# Patient Record
Sex: MALE | Race: WHITE | HISPANIC OR LATINO | Employment: OTHER | URBAN - METROPOLITAN AREA
[De-identification: names, ages, dates, MRNs, and addresses within clinical notes are randomized per-mention and may not be internally consistent; named-entity substitution may affect disease eponyms.]

---

## 2018-01-10 NOTE — MISCELLANEOUS
Message   Recorded as Task   Date: 04/18/2016 11:10 AM, Created By: Alessio Heredia   Task Name: Med Renewal Request   Assigned To: [facility] [place] [facility]   Regarding Patient: Raisa Hairston, Status: Active   Comment:    Jackie Hernandez - 18 Apr 2016 11:10 AM     TASK CREATED  Caller: Self; Renew Medication; [phone] (Home)  Received a TC from the pt  today requesting a refill on the Lyrica  He stated he has not received the refill as of yet in the mail  He only has 3 pills left  Pt  last seen on 3/15 and Lyrica last ordered at that time with no refills  Next povs is on 6/7  DG to advise  thanks   Alvin Linares - 18 Apr 2016 12:28 PM     TASK REPLIED TO: Previously Assigned To [facility] [place] [facility]  Can you please call [employer] at: [phone] as I faxed that script a month ago can we see if they received it? Jackie Hernandez - 18 Apr 2016 2:01 PM     TASK EDITED  Notified [employer] at number provided and s/w Dalia Anand and she states that no script was received from about a month ago  She would need everything refaxed, including the insurance and photo ID  DG to advise  thanks   Alvin Linares - 18 Apr 2016 3:26 PM     TASK REPLIED TO: Previously Assigned To Alvin Linares  Please call the patient and advise him that apparently [facility] never received the refill script I faxed in March  We can write him a 2 week script for the Lyrica 300 mg BID to fill until we can get him the pills from [facility]  However, please advise him that in the future, he should call once he is down to 2 weeks left a not just a few days because [employer] requires at least a week to turn around the script  Jackie Hernandez - 18 Apr 2016 4:13 PM     TASK EDITED  Attempted to call pt , unable to leave message no answering machine  Jackie Hernandez - 18 Apr 2016 4:13 PM     TASK IN PROGRESS   Teresa Posadas - 19 Apr 2016 2:00 PM     TASK EDITED    I spoke with pt, advised above  Pt will  script tomorrow    *******   Alvin Linares - 19 Apr 2016 2:06 PM     TASK REPLIED TO: Previously Assigned To Alvin Linares  A 2 week script is available for  at the   Active Problems    1  ALC (alcoholic liver cirrhosis) (571 2) (K70 30)   2  Chronic low back pain (724 2,338 29) (M54 5,G89 29)   3  Chronic lumbar radiculopathy (724 4) (M54 16)   4  Encounter for long-term opiate analgesic use (V58 69) (Z79 891)   5  Foraminal stenosis of lumbar region (724 02) (M99 83)   6  Opioid dependence, continuous (304 01) (F11 20)   7  Pain syndrome, chronic (338 4) (G89 4)    Current Meds   1  Ciprofloxacin HCl - 500 MG Oral Tablet; Therapy: 77XJJ3849 to (Evaluate:17Feb2016) Recorded   2  Citalopram Hydrobromide 40 MG Oral Tablet; Therapy: 72MSR3873 to (Evaluate:01Jun2015) Recorded   3  Folic Acid 1 MG Oral Tablet; Therapy: 12SMM4409 to (Evaluate:01Jun2015) Recorded   4  Lisinopril 20 MG Oral Tablet; Therapy: 68KUE4220 to (Evaluate:02Apr2015) Recorded   5  Lyrica 300 MG Oral Capsule; Take 1 po bid; Therapy: 27RUE1309 to (Evaluate:15Oct2016); Last Rx:18Apr2016 Ordered   6  MetFORMIN HCl - 500 MG Oral Tablet; Therapy: 00AUJ5563 to (Evaluate:01Jun2015) Recorded   7  Naproxen 500 MG Oral Tablet; Therapy: 25WKJ2442 to (Evaluate:02Apr2015) Recorded   8  Omeprazole 20 MG Oral Capsule Delayed Release; Therapy: 07LMC8835 to (Evaluate:23Mar2015) Recorded   9  Potassium Chloride 20 MEQ Oral Packet; Therapy: 10OYQ4495 to (Evaluate:01Jun2015) Recorded   10  ProAir  (90 Base) MCG/ACT Inhalation Aerosol Solution; Therapy: 26IMY4314 to (Evaluate:90Elv2260) Recorded   11  ROPINIRole HCl - 3 MG Oral Tablet; Therapy: 67Mha6092 to (Evaluate:01Jun2015) Recorded   12  Spironolactone 100 MG Oral Tablet; Therapy: 49DRC6767 to (Evaluate:01Jun2015) Recorded   13  Torsemide 20 MG Oral Tablet; Therapy: 96EHR7625 to (Evaluate:01Jun2015) Recorded   14  TraMADol HCl ER (Biphasic) 100 MG Oral Tablet Extended Release 24 Hour; Take 1 PO    bid;     Therapy: 96OCG8128 to (Evaluate:13Jun2016); Last Rx:15Mar2016 Ordered   15  Vitamin D (Ergocalciferol) 38286 UNIT Oral Capsule; Therapy: 96YUB4203 to (Evaluate:21Apr2015) Recorded    Allergies    1  Iodinated Contrast Media    Signatures   Electronically signed by :  Petr Reza, ; Apr 19 2016  2:09PM EST                       (Author)

## 2018-01-10 NOTE — MISCELLANEOUS
Message   Recorded as Task   Date: 06/15/2016 09:33 AM, Created By: Dong Oliver   Task Name: Follow Up   Assigned To: Taylor Johnson   Regarding Patient: Wolf Aquino, Status: In Progress   Wiltoncaio oMshe - 15 Romulo 2016 9:33 AM     TASK CREATED  Received letter, patient accepted into the [facility] program until 12/31/16  Lyrica has been shipped to patient's home  Abdiel Quigley - 15 Romulo 2016 10:02 AM     TASK REPLIED TO: Previously Assigned To Alvin Linares Jessie - 16 Jun 2016 8:10 AM     TASK IN PROGRESS        Active Problems    1  ALC (alcoholic liver cirrhosis) (571 2) (K70 30)   2  Chronic low back pain (724 2,338 29) (M54 5,G89 29)   3  Chronic lumbar radiculopathy (724 4) (M54 16)   4  Encounter for long-term opiate analgesic use (V58 69) (Z79 891)   5  Foraminal stenosis of lumbar region (724 02) (M99 83)   6  Opioid dependence, continuous (304 01) (F11 20)   7  Pain syndrome, chronic (338 4) (G89 4)    Current Meds   1  Ciprofloxacin HCl - 500 MG Oral Tablet; Therapy: 61PKX0579 to (Evaluate:52Qmm9771) Recorded   2  Citalopram Hydrobromide 40 MG Oral Tablet; Therapy: 35XRR8215 to (Evaluate:01Jun2015) Recorded   3  Folic Acid 1 MG Oral Tablet; Therapy: 75UEQ5321 to (Evaluate:01Jun2015) Recorded   4  Lisinopril 20 MG Oral Tablet; Therapy: 47IWH6064 to (Evaluate:02Apr2015) Recorded   5  Lyrica 300 MG Oral Capsule; Take 1 po bid; Therapy: 35HFX6432 to (Evaluate:18Oct2016); Last Rx:21Apr2016 Ordered   6  MetFORMIN HCl - 500 MG Oral Tablet; Therapy: 28MWW4763 to (Evaluate:01Jun2015) Recorded   7  Naproxen 500 MG Oral Tablet; Therapy: 96SBI4261 to (Evaluate:02Apr2015) Recorded   8  Omeprazole 20 MG Oral Capsule Delayed Release; Therapy: 04PBM8995 to (Evaluate:23Mar2015) Recorded   9  Potassium Chloride 20 MEQ Oral Packet; Therapy: 23FQG0229 to (Evaluate:01Jun2015) Recorded   10  ProAir  (90 Base) MCG/ACT Inhalation Aerosol Solution;     Therapy: 58NLF9211 to (Evaluate:42Dcv8162) Recorded   11  ROPINIRole HCl - 3 MG Oral Tablet; Therapy: 29Bju6774 to (Evaluate:01Jun2015) Recorded   12  Spironolactone 100 MG Oral Tablet; Therapy: 85IRA1371 to (Evaluate:01Jun2015) Recorded   13  Torsemide 20 MG Oral Tablet; Therapy: 96KAD4207 to (Evaluate:01Jun2015) Recorded   14  TraMADol HCl ER (Biphasic) 100 MG Oral Tablet Extended Release 24 Hour; Take 1 PO    bid; Therapy: 57ARB2679 to (Evaluate:13Jun2016); Last Rx:15Mar2016 Ordered   15  Vitamin D (Ergocalciferol) 33075 UNIT Oral Capsule; Therapy: 81SWU0592 to (Evaluate:21Apr2015) Recorded    Allergies    1   Iodinated Contrast Media    Signatures   Electronically signed by : Shantelle Ball, ; Jun 16 2016 11:50AM EST                       (Author)

## 2018-01-11 NOTE — RESULT NOTES
Message   Recorded as Task   Date: 06/01/2016 11:27 AM, Created By: Jayy Ellison   Task Name: Care Coordination   Assigned To: John Reid   Regarding Patient: Carolyn West, Status: Active   CommentMaceo Chill - 01 Jun 2016 11:27 AM     TASK CREATED  Received VM today at 0941 from [facility]  She states the appeal for tramadol ER 100mg was sent [facility] and that they would get back to us  Alvin Linares - 01 Jun 2016 11:41 AM     TASK REPLIED TO: Previously Assigned To Alvin Linares  Provider aware  Thank you  Signatures   Electronically signed by :  Reina Hawkins, ; Jun 2 2016  7:42AM EST                       (Author)

## 2018-01-13 NOTE — MISCELLANEOUS
Message   Recorded as Task   Date: 03/09/2016 08:58 AM, Created By: Purvi Lara   Task Name: Miscellaneous   Assigned To: Purvi Lara   Regarding Patient: Mayito Rizvi, Status: Active   CommentAlsam Hilario - 09 Mar 2016 8:58 AM     TASK CREATED  Patient left a message with the answering service to cancel his appt/ 3/9/16 @ 10:45 AM with Alvin  I called the patient and left a message to call the office to reschedule  He gave no reason for the cancellation  Alvin Linares - 09 Mar 2016 11:21 AM     TASK REPLIED TO: Previously Assigned To Alvin Linares  Provider aware  Thank you! Active Problems    1  ALC (alcoholic liver cirrhosis) (571 2) (K70 30)   2  Chronic low back pain (724 2,338 29) (M54 5,G89 29)   3  Chronic lumbar radiculopathy (724 4) (M54 16)   4  Encounter for long-term (current) use of other high-risk medications (V58 69) (Z79 899)   5  Foraminal stenosis of lumbar region (724 02) (M99 83)   6  Pain syndrome, chronic (338 4) (G89 4)    Current Meds   1  Ciprofloxacin HCl - 500 MG Oral Tablet; Therapy: 01FXF2673 to (Evaluate:30Seg4873) Recorded   2  Citalopram Hydrobromide 40 MG Oral Tablet; Therapy: 05WLZ8986 to (Evaluate:01Jun2015) Recorded   3  Folic Acid 1 MG Oral Tablet; Therapy: 52LUH7161 to (Evaluate:01Jun2015) Recorded   4  Lisinopril 20 MG Oral Tablet; Therapy: 35XAJ0724 to (Evaluate:02Apr2015) Recorded   5  Lyrica 300 MG Oral Capsule; Take 1 po bid; Therapy: 93NTV8433 to (Evaluate:67Ocj6287); Last Rx:03Nov2015 Ordered   6  MetFORMIN HCl - 500 MG Oral Tablet; Therapy: 49OGA1974 to (Evaluate:01Jun2015) Recorded   7  Naproxen 500 MG Oral Tablet; Therapy: 04RAK5313 to (Evaluate:02Apr2015) Recorded   8  Nucynta ER 50 MG Oral Tablet Extended Release 12 Hour; Take 1 pill daily x 4 days,   then STOP  Then Start Tramadol ER; Therapy: 69BAM8360 to (Evaluate:17Jan2016); Last Rx:13Jan2016 Ordered   9  Omeprazole 20 MG Oral Capsule Delayed Release;    Therapy: 37ZJG4583 to (Evaluate:23Mar2015) Recorded   10  Potassium Chloride 20 MEQ Oral Packet; Therapy: 72AQK7720 to (Evaluate:01Jun2015) Recorded   11  ProAir  (90 Base) MCG/ACT Inhalation Aerosol Solution; Therapy: 99CTX4516 to (Evaluate:17May2015) Recorded   12  ROPINIRole HCl - 3 MG Oral Tablet; Therapy: 89NYU7300 to (Evaluate:01Jun2015) Recorded   13  Spironolactone 100 MG Oral Tablet; Therapy: 72GXD5256 to (Evaluate:01Jun2015) Recorded   14  Torsemide 20 MG Oral Tablet; Therapy: 56LCV7804 to (Evaluate:01Jun2015) Recorded   15  TraMADol HCl ER (Biphasic) 100 MG Oral Tablet Extended Release 24 Hour; Take 1 PO    bid; Therapy: 13LIL7138 to (Evaluate:13Mar2016); Last Rx:13Jan2016 Ordered   16  Vitamin D (Ergocalciferol) 06110 UNIT Oral Capsule; Therapy: 72OEI3334 to (Evaluate:21Apr2015) Recorded    Allergies    1  Iodinated Contrast Media    Signatures   Electronically signed by :  William Chavez, ; Mar  9 2016  3:26PM EST                       (Author)

## 2018-01-13 NOTE — MISCELLANEOUS
Message   Recorded as Task   Date: 10/31/2016 12:17 PM, Created By: Nikkie Ocampo   Task Name: Follow Up   Assigned To: [address] [place],Team   Regarding Patient: Abdifatah Palmer, Status: In Progress   Comment:    Alvin Linares - 31 Oct 2016 12:17 PM     TASK CREATED  Can you please call the patient and advise him that his last drug screen was positive for THC? Can he explain? Jackie Hernandez - 31 Oct 2016 3:05 PM     TASK EDITED  Attempted to call the pt  and LMOM to CB  Jackie Hernandez - 31 Oct 2016 3:05 PM     TASK IN PROGRESS   Fermin Moore - 03 Nov 2016 10:30 AM     TASK EDITED   Fermin Moore - 39 Nov 2016 4:32 PM     TASK EDITED  [facility] to cb on home / cell     2nd attempt   Jackie Hernandez - 07 Nov 2016 1:42 PM     TASK EDITED   Next SOVS is scheduled for 1/27  Would you like us to send him a can't reach you letter? Alvin Linares - 07 Nov 2016 2:14 PM     TASK REPLIED TO: Previously Assigned To Alvin Linares  Yes, please send the letter  Jackie Hernandez - 07 Nov 2016 3:48 PM     TASK EDITED   Letter Sent      Active Problems    1  ALC (alcoholic liver cirrhosis) (571 2) (K70 30)   2  Chronic low back pain (724 2,338 29) (M54 5,G89 29)   3  Chronic lumbar radiculopathy (724 4) (M54 16)   4  Encounter for long-term opiate analgesic use (V58 69) (Z79 891)   5  Foraminal stenosis of lumbar region (724 02) (M99 83)   6  Opioid dependence, continuous (304 01) (F11 20)   7  Pain syndrome, chronic (338 4) (G89 4)    Current Meds   1  Ciprofloxacin HCl - 500 MG Oral Tablet; Therapy: 70QCX1264 to (Evaluate:96Mdi7294) Recorded   2  Citalopram Hydrobromide 40 MG Oral Tablet; Therapy: 09YAM4613 to (Evaluate:01Jun2015) Recorded   3  Folic Acid 1 MG Oral Tablet; Therapy: 78LLF9862 to (Evaluate:01Jun2015) Recorded   4  Lisinopril 20 MG Oral Tablet; Therapy: 00KCN5062 to (Evaluate:02Apr2015) Recorded   5  Lyrica 200 MG Oral Capsule; Take 1 PO bid; Therapy: 14AXY1333 to (Evaluate:23Jan2017); Last Rx:25Oct2016 Ordered   6  Naproxen 500 MG Oral Tablet; Therapy: 11BLA5768 to (Evaluate:02Apr2015) Recorded   7  Omeprazole 20 MG Oral Capsule Delayed Release; Therapy: 61RGI8064 to (Evaluate:23Mar2015) Recorded   8  Potassium Chloride 20 MEQ Oral Packet; Therapy: 93BPQ0653 to (Evaluate:01Jun2015) Recorded   9  ProAir  (90 Base) MCG/ACT Inhalation Aerosol Solution; Therapy: 12IAA8630 to (Evaluate:17May2015) Recorded   10  ROPINIRole HCl - 3 MG Oral Tablet; Therapy: 95ICK2253 to (Evaluate:01Jun2015) Recorded   11  Spironolactone 100 MG Oral Tablet; Therapy: 31NID1730 to (Evaluate:01Jun2015) Recorded   12  Torsemide 20 MG Oral Tablet; Therapy: 33NLP7142 to (Evaluate:01Jun2015) Recorded   13  TraMADol HCl ER (Biphasic) 100 MG Oral Tablet Extended Release 24 Hour; Take 1 PO    bid; Therapy: 48JPO4302 to (Evaluate:23Jan2017); Last Rx:25Oct2016 Ordered   14  Vitamin D (Ergocalciferol) 32279 UNIT Oral Capsule; Therapy: 14IVA5915 to (Evaluate:21Apr2015) Recorded    Allergies    1   Iodinated Contrast Media    Signatures   Electronically signed by : Ga Crump, ; Nov 8 2016 10:00AM EST                       (Author)

## 2018-01-15 NOTE — MISCELLANEOUS
Message   Recorded as Task   Date: 05/31/2016 02:31 PM, Created By: Philip Méndez   Task Name: Follow Up   Assigned To: [facility] [place] [facility]   Regarding Patient: Mayito Rizvi, Status: Active   Comment:    Fermin Moore - 31 May 2016 2:31 PM     TASK CREATED  Caller: Self; General Medical Question; [phone] (Home)  [facility] 5/31/2016 @ 1344  Pt calling re: tramadol  States he called last week about getting it covered by his ins  As of now - it has not been added to his deductibe  Pls advise  Advised pt, DG is working on a non formulary exception rquest  This office will c/b when a determination is made  May take several days  Pt verbalized understanding  Pt is in [facility] - 31 May 2016 2:38 PM     TASK REPLIED TO: Previously Assigned To [facility] [place] [facility]  I am working on it and as soon as we know anything, I will let him know  Fermin Moore - 31 May 2016 4:13 PM     TASK EDITED  pt aware  Active Problems    1  ALC (alcoholic liver cirrhosis) (571 2) (K70 30)   2  Chronic low back pain (724 2,338 29) (M54 5,G89 29)   3  Chronic lumbar radiculopathy (724 4) (M54 16)   4  Encounter for long-term opiate analgesic use (V58 69) (Z79 891)   5  Foraminal stenosis of lumbar region (724 02) (M99 83)   6  Opioid dependence, continuous (304 01) (F11 20)   7  Pain syndrome, chronic (338 4) (G89 4)    Current Meds   1  Ciprofloxacin HCl - 500 MG Oral Tablet; Therapy: 78NGP7354 to (Evaluate:17Feb2016) Recorded   2  Citalopram Hydrobromide 40 MG Oral Tablet; Therapy: 09VGY3576 to (Evaluate:01Jun2015) Recorded   3  Folic Acid 1 MG Oral Tablet; Therapy: 83EEB9116 to (Evaluate:01Jun2015) Recorded   4  Lisinopril 20 MG Oral Tablet; Therapy: 98RMO3038 to (Evaluate:02Apr2015) Recorded   5  Lyrica 300 MG Oral Capsule; Take 1 po bid; Therapy: 09UKL1321 to (Evaluate:18Oct2016); Last Rx:21Apr2016 Ordered   6  MetFORMIN HCl - 500 MG Oral Tablet;    Therapy: 17TWS1062 to (Evaluate:01Jun2015) Recorded   7  Naproxen 500 MG Oral Tablet; Therapy: 88ESG1741 to (Evaluate:02Apr2015) Recorded   8  Omeprazole 20 MG Oral Capsule Delayed Release; Therapy: 71XRG0976 to (Evaluate:23Mar2015) Recorded   9  Potassium Chloride 20 MEQ Oral Packet; Therapy: 00XFE5837 to (Evaluate:01Jun2015) Recorded   10  ProAir  (90 Base) MCG/ACT Inhalation Aerosol Solution; Therapy: 97NTU7969 to (Evaluate:17May2015) Recorded   11  ROPINIRole HCl - 3 MG Oral Tablet; Therapy: 63Hpx5000 to (Evaluate:01Jun2015) Recorded   12  Spironolactone 100 MG Oral Tablet; Therapy: 26MDR7426 to (Evaluate:01Jun2015) Recorded   13  Torsemide 20 MG Oral Tablet; Therapy: 35GCJ6396 to (Evaluate:01Jun2015) Recorded   14  TraMADol HCl ER (Biphasic) 100 MG Oral Tablet Extended Release 24 Hour; Take 1 PO    bid; Therapy: 44LTX3950 to (Evaluate:13Jun2016); Last Rx:15Mar2016 Ordered   15  Vitamin D (Ergocalciferol) 72715 UNIT Oral Capsule; Therapy: 50EJQ0206 to (Evaluate:21Apr2015) Recorded    Allergies    1   Iodinated Contrast Media    Signatures   Electronically signed by : Leandro Munoz, ; May 31 2016  4:14PM EST                       (Author)

## 2018-01-15 NOTE — MISCELLANEOUS
Message   Recorded as Task   Date: 2016 11:43 AM, Created By: Ilda Nunez   Task Name: Follow Up   Assigned To: [facility] [place] [facility]   Regarding Patient: Candace Romo, Status: In Progress   Comment:    Fermin Moore - 24 May 2016 11:43 AM     TASK CREATED  Caller: Self; General Medical Question; [phone] (Home)  [facility] 2016 @ [phone]  Calling re: problem w/ lyrica rx  pt states he is almost out of samples  Pt is calling from [address] [place] [address] cb  Fermin Moore - 24 May 2016 12:14 PM     TASK EDITED  s/w pt, states he is in [employer], hs mother  - buried yesterday  Getting his father settled, hopes to be home at the end of the week  2 capsules of lyrica on hand  Pt states he received a letter from [employer] approx 2 weeks ago 5/10 stating that there s a problem with the processing of his lyrica rx  Pt is stretching his lyrica to keep it in his system  Pls advise  Advised pt, will d/w DG and cb  Alvin Linares - 24 May 2016 12:24 PM     TASK REPLIED TO: Previously Assigned To Alvin Linares  I have faxed and re-faxed it for a 3 rd time last week with all of the information they have requested  He will have to not take Lyrica for 2 days, then take a dose, then hold off another 2 days and we can figure it out when he gets back here, otherwise there is nothing else we can do  Can you call [facility] at: [phone] to confirm that they received my fax last week with the updated information that they requested  Fermin Moore - 24 May 2016 3:20 PM     TASK EDITED  s/w [employer]  States pt's fax was missing provider info on one fax, then the provider's signature was cut off on another fax  Requested this order be expedited  Per [facility] Code42, processing may be expedited, medications are not expedited  Refaxed into to [phone] [facility] as per [facility]  Fermin Moore - 24 May 2016 3:26 PM     TASK EDITED  *** FYI ***  s/w pt, advised of above   pt verbalized understanding and appreciation  Alvin Linares - 24 May 2016 4:06 PM     TASK REPLIED TO: Previously Assigned To Alvin Linares  Provider aware  THank you for your help  Active Problems    1  ALC (alcoholic liver cirrhosis) (571 2) (K70 30)   2  Chronic low back pain (724 2,338 29) (M54 5,G89 29)   3  Chronic lumbar radiculopathy (724 4) (M54 16)   4  Encounter for long-term opiate analgesic use (V58 69) (Z79 891)   5  Foraminal stenosis of lumbar region (724 02) (M99 83)   6  Opioid dependence, continuous (304 01) (F11 20)   7  Pain syndrome, chronic (338 4) (G89 4)    Current Meds   1  Ciprofloxacin HCl - 500 MG Oral Tablet; Therapy: 95BGS7633 to (Evaluate:17Feb2016) Recorded   2  Citalopram Hydrobromide 40 MG Oral Tablet; Therapy: 12CTM0063 to (Evaluate:01Jun2015) Recorded   3  Folic Acid 1 MG Oral Tablet; Therapy: 34JWJ2589 to (Evaluate:01Jun2015) Recorded   4  Lisinopril 20 MG Oral Tablet; Therapy: 73RWD5366 to (Evaluate:02Apr2015) Recorded   5  Lyrica 300 MG Oral Capsule; Take 1 po bid; Therapy: 85GLW3593 to (Evaluate:18Oct2016); Last Rx:21Apr2016 Ordered   6  MetFORMIN HCl - 500 MG Oral Tablet; Therapy: 75KDF3265 to (Evaluate:01Jun2015) Recorded   7  Naproxen 500 MG Oral Tablet; Therapy: 92ILF5675 to (Evaluate:02Apr2015) Recorded   8  Omeprazole 20 MG Oral Capsule Delayed Release; Therapy: 54XCI8052 to (Evaluate:23Mar2015) Recorded   9  Potassium Chloride 20 MEQ Oral Packet; Therapy: 46HXP3888 to (Evaluate:01Jun2015) Recorded   10  ProAir  (90 Base) MCG/ACT Inhalation Aerosol Solution; Therapy: 36EKU3176 to (Evaluate:01Gua2311) Recorded   11  ROPINIRole HCl - 3 MG Oral Tablet; Therapy: 13Nzt1500 to (Evaluate:01Jun2015) Recorded   12  Spironolactone 100 MG Oral Tablet; Therapy: 31CNG7808 to (Evaluate:01Jun2015) Recorded   13  Torsemide 20 MG Oral Tablet; Therapy: 73RVG3760 to (Evaluate:01Jun2015) Recorded   14   TraMADol HCl ER (Biphasic) 100 MG Oral Tablet Extended Release 24 Hour; Take 1 PO    bid; Therapy: 55UEF7948 to (Evaluate:13Jun2016); Last Rx:15Mar2016 Ordered   15  Vitamin D (Ergocalciferol) 57061 UNIT Oral Capsule; Therapy: 07BHM3580 to (Evaluate:21Apr2015) Recorded    Allergies    1   Iodinated Contrast Media    Signatures   Electronically signed by : Junaid Delaney, ; May 25 2016  2:04PM EST                       (Author)

## 2018-01-15 NOTE — RESULT NOTES
Message   Recorded as Task   Date: 07/11/2016 01:16 PM, Created By: Julio Colindres   Task Name: Med Renewal Request   Assigned To: [facility] [place] [facility]   Regarding Patient: Amanda Damon, Status: Active   Comment:    Gifty Ramsay - 11 Jul 2016 1:16 PM     TASK CREATED  Caller: Self; Renew Medication; [phone] (Home)  Received VM from pt  on [facility] triage line from 1251 pm  Pt  states that he needs a refill on his Tramadol 100 mg ER, takes 1 tab Q12H  Pt  states that he ran out on 7/5  Pt  requesting c/b at [phone]  Gifty Ramsay - 11 Jul 2016 2:45 PM     TASK EDITED  S/w pt  who states that he has been out of the Tramadol  mg since 7/5  Pt  is requesting refill on rx  Confirmed w/ pt  that he used all refills from March rx  Pt  was advised that I will relay information to DG, and c/b w/ any recommendations  Confirmed OV on Wed 7/13, pt  to arrive at 830 am      Please advise  Thank you  Alvin Linares - 11 Jul 2016 3:34 PM     TASK REPLIED TO: Previously Assigned To Alvin Linares  He will have to wait until his OV, especially since it is in 2 days  I will take care of it then  John Reid - 12 Jul 2016 11:32 AM     TASK REPLIED TO: Previously Assigned To [facility] [place] [facility]  Spoke with pt and advised of the same,verbalizes understanding  Signatures   Electronically signed by :  Vira Grewal, ; Jul 12 2016 11:33AM EST                       (Author)

## 2018-01-15 NOTE — MISCELLANEOUS
Message   Recorded as Task   Date: 06/06/2016 03:45 PM, Created By: [employer]   Task Name: Follow Up   Assigned To: [facility]   Regarding Patient: Malena Levy, Status: In Progress   Fan Freire - 06 Jun 2016 3:45 PM     TASK CREATED  Please call the patient and advise him that I recieved notification that the Tramadol ER was APPROVED through my APPEAL until 12/31/16  We are still awaiting word from RE2  Thank you  Gifty Ramsay - 06 Jun 2016 3:51 PM     TASK REPLIED TO: Previously Assigned To [facility]  S/w pt  and advised of above  Pt  was verbalized understanding and was very appreciative and said "Tell him I thank him very much " Pt  understands we are still waiting to hear from RE2 at this time  Alvin Linares - 06 Jun 2016 3:52 PM     TASK REPLIED TO: Previously Assigned To Alvin Linares  Provider aware  We will call once we hear anything from RE2  Gifty Ramsay - 06 Jun 2016 3:57 PM     TASK IN PROGRESS        Active Problems    1  ALC (alcoholic liver cirrhosis) (571 2) (K70 30)   2  Chronic low back pain (724 2,338 29) (M54 5,G89 29)   3  Chronic lumbar radiculopathy (724 4) (M54 16)   4  Encounter for long-term opiate analgesic use (V58 69) (Z79 891)   5  Foraminal stenosis of lumbar region (724 02) (M99 83)   6  Opioid dependence, continuous (304 01) (F11 20)   7  Pain syndrome, chronic (338 4) (G89 4)    Current Meds   1  Ciprofloxacin HCl - 500 MG Oral Tablet; Therapy: 76HLT2889 to (Evaluate:11Vxz3244) Recorded   2  Citalopram Hydrobromide 40 MG Oral Tablet; Therapy: 51PSF7031 to (Evaluate:01Jun2015) Recorded   3  Folic Acid 1 MG Oral Tablet; Therapy: 15VDD9317 to (Evaluate:01Jun2015) Recorded   4  Lisinopril 20 MG Oral Tablet; Therapy: 28QTX6826 to (Evaluate:02Apr2015) Recorded   5  Lyrica 300 MG Oral Capsule; Take 1 po bid; Therapy: 89QEO8528 to (Evaluate:18Oct2016); Last Rx:21Apr2016 Ordered   6   MetFORMIN HCl - 500 MG Oral Tablet; Therapy: 11OEH4789 to (Evaluate:01Jun2015) Recorded   7  Naproxen 500 MG Oral Tablet; Therapy: 77BZC1633 to (Evaluate:02Apr2015) Recorded   8  Omeprazole 20 MG Oral Capsule Delayed Release; Therapy: 55NKJ9965 to (Evaluate:23Mar2015) Recorded   9  Potassium Chloride 20 MEQ Oral Packet; Therapy: 37DNM0741 to (Evaluate:01Jun2015) Recorded   10  ProAir  (90 Base) MCG/ACT Inhalation Aerosol Solution; Therapy: 00WPH4359 to (Evaluate:24Uwc4159) Recorded   11  ROPINIRole HCl - 3 MG Oral Tablet; Therapy: 00Fqn9451 to (Evaluate:01Jun2015) Recorded   12  Spironolactone 100 MG Oral Tablet; Therapy: 10JTZ8281 to (Evaluate:01Jun2015) Recorded   13  Torsemide 20 MG Oral Tablet; Therapy: 45VBV1405 to (Evaluate:01Jun2015) Recorded   14  TraMADol HCl ER (Biphasic) 100 MG Oral Tablet Extended Release 24 Hour; Take 1 PO    bid; Therapy: 79CBH3083 to (Evaluate:13Jun2016); Last Rx:15Mar2016 Ordered   15  Vitamin D (Ergocalciferol) 47122 UNIT Oral Capsule; Therapy: 86VXT3623 to (Evaluate:21Apr2015) Recorded    Allergies    1   Iodinated Contrast Media    Signatures   Electronically signed by : Davon Najera, ; Jun 7 2016 11:35AM EST                       (Author)

## 2018-01-15 NOTE — MISCELLANEOUS
Message   Recorded as Task   Date: 06/20/2016 09:17 PM, Created By: Keysha Reaves   Task Name: Follow Up   Assigned To: [facility]   Regarding Patient: Rimma Agrawal, Status: In Progress   Comment:    Alvin Linares - 20 Jun 2016 9:17 PM     TASK CREATED  Can you please call the [employer] connections to [facility] program regarding the patient's application  This was faxed for the 8th time and was supposed to be expedited on 6/1/16  We need an answer  We have been trying to get this approved for almost 2 months  If they are not able to give you an answer, you need to please DEMAND to speak with a supervisor immediately as this is unnacceptable  Thank you  Call: [phone]  Fermin Moore - 21 Jun 2016 10:47 AM     TASK EDITED  s/w Ky Mullen at [employer]  order was received 6/8, delivered on 6/16 at , signed by Fermin Ansari - 21 Jun 2016 10:50 AM     TASK EDITED  *** FYI ***  Attempted to contact pt, linhom to cb  Alvin Linares - 21 Jun 2016 11:00 AM     TASK REPLIED TO: Previously Assigned To Alvin Linares  Provider aware  We never recieved a fax  Thank you  Active Problems    1  ALC (alcoholic liver cirrhosis) (571 2) (K70 30)   2  Chronic low back pain (724 2,338 29) (M54 5,G89 29)   3  Chronic lumbar radiculopathy (724 4) (M54 16)   4  Encounter for long-term opiate analgesic use (V58 69) (Z79 891)   5  Foraminal stenosis of lumbar region (724 02) (M99 83)   6  Opioid dependence, continuous (304 01) (F11 20)   7  Pain syndrome, chronic (338 4) (G89 4)    Current Meds   1  Ciprofloxacin HCl - 500 MG Oral Tablet; Therapy: 90JEX2998 to (Evaluate:51Mqt0238) Recorded   2  Citalopram Hydrobromide 40 MG Oral Tablet; Therapy: 79VIM0203 to (Evaluate:01Jun2015) Recorded   3  Folic Acid 1 MG Oral Tablet; Therapy: 26PCY3678 to (Evaluate:01Jun2015) Recorded   4  Lisinopril 20 MG Oral Tablet; Therapy: 47GFL4272 to (Evaluate:02Apr2015) Recorded   5  Lyrica 300 MG Oral Capsule;  Take 1 po bid;   Therapy: 57GJT9458 to (Evaluate:18Oct2016); Last Rx:21Apr2016 Ordered   6  MetFORMIN HCl - 500 MG Oral Tablet; Therapy: 13JIX6560 to (Evaluate:01Jun2015) Recorded   7  Naproxen 500 MG Oral Tablet; Therapy: 21AFR6228 to (Evaluate:02Apr2015) Recorded   8  Omeprazole 20 MG Oral Capsule Delayed Release; Therapy: 44BIF0556 to (Evaluate:23Mar2015) Recorded   9  Potassium Chloride 20 MEQ Oral Packet; Therapy: 46RYB7321 to (Evaluate:01Jun2015) Recorded   10  ProAir  (90 Base) MCG/ACT Inhalation Aerosol Solution; Therapy: 72WOO8086 to (Evaluate:17May2015) Recorded   11  ROPINIRole HCl - 3 MG Oral Tablet; Therapy: 13Iyl3059 to (Evaluate:01Jun2015) Recorded   12  Spironolactone 100 MG Oral Tablet; Therapy: 93IUC7662 to (Evaluate:01Jun2015) Recorded   13  Torsemide 20 MG Oral Tablet; Therapy: 97VVN8736 to (Evaluate:01Jun2015) Recorded   14  TraMADol HCl ER (Biphasic) 100 MG Oral Tablet Extended Release 24 Hour; Take 1 PO    bid; Therapy: 53CHZ1444 to (Evaluate:13Jun2016); Last Rx:15Mar2016 Ordered   15  Vitamin D (Ergocalciferol) 52847 UNIT Oral Capsule; Therapy: 22OWR2288 to (Evaluate:21Apr2015) Recorded    Allergies    1   Iodinated Contrast Media    Signatures   Electronically signed by : Leandro Munoz, ; Jun 21 2016  4:21PM EST                       (Author)

## 2018-01-16 NOTE — MISCELLANEOUS
Message   Recorded as Task   Date: 03/21/2016 08:08 AM, Created By: Joey Gillespie   Task Name: Follow Up   Assigned To: [facility]   Regarding Patient: Hira Boudreaux, Status: Active   Comment:    Alvin Linares - 21 Mar 2016 8:08 AM     TASK CREATED  Please call the patient and advise him that this is the second UDS that is positive for alcohol  Please advise him, as we have discussed in the past, he should not be drinking alcohol while on the Lyrica or the Tramadol ER  Please advise him that he agreed to abstain from alcohol with the contract and that any future violations will result in him being discharged from the patient according to the contract agreement  Jackie Hernandez - 21 Mar 2016 11:12 AM     TASK EDITED  S/w pt  and he states he only had one drink and he wanted to know if it was because of his liver  I explained the process and advised him about abstaining from alcohol and that future violations would result in discharge  Pt  verbalized understanding  Alvin Linares - 21 Mar 2016 12:13 PM     TASK REPLIED TO: Previously Assigned To Joey Gillespie  Provider aware  Thank you  Active Problems    1  ALC (alcoholic liver cirrhosis) (571 2) (K70 30)   2  Chronic low back pain (724 2,338 29) (M54 5,G89 29)   3  Chronic lumbar radiculopathy (724 4) (M54 16)   4  Encounter for long-term opiate analgesic use (V58 69) (Z79 891)   5  Foraminal stenosis of lumbar region (724 02) (M99 83)   6  Opioid dependence, continuous (304 01) (F11 20)   7  Pain syndrome, chronic (338 4) (G89 4)    Current Meds   1  Ciprofloxacin HCl - 500 MG Oral Tablet; Therapy: 71UBJ2473 to (Evaluate:95Ekc8326) Recorded   2  Citalopram Hydrobromide 40 MG Oral Tablet; Therapy: 49JXC6762 to (Evaluate:01Jun2015) Recorded   3  Folic Acid 1 MG Oral Tablet; Therapy: 08YCX4572 to (Evaluate:01Jun2015) Recorded   4  Lisinopril 20 MG Oral Tablet; Therapy: 06CSI1527 to (Evaluate:02Apr2015) Recorded   5   Lyrica 300 MG Oral Capsule; Take 1 po bid; Therapy: 04LKW3897 to (Evaluate:13Jun2016); Last Rx:15Mar2016 Ordered   6  MetFORMIN HCl - 500 MG Oral Tablet; Therapy: 79RNO1240 to (Evaluate:01Jun2015) Recorded   7  Naproxen 500 MG Oral Tablet; Therapy: 93ADX7549 to (Evaluate:02Apr2015) Recorded   8  Omeprazole 20 MG Oral Capsule Delayed Release; Therapy: 73ZXU8056 to (Evaluate:23Mar2015) Recorded   9  Potassium Chloride 20 MEQ Oral Packet; Therapy: 08EYJ8724 to (Evaluate:01Jun2015) Recorded   10  ProAir  (90 Base) MCG/ACT Inhalation Aerosol Solution; Therapy: 21RJS8274 to (Evaluate:17May2015) Recorded   11  ROPINIRole HCl - 3 MG Oral Tablet; Therapy: 21Ayz3682 to (Evaluate:01Jun2015) Recorded   12  Spironolactone 100 MG Oral Tablet; Therapy: 97KQM3730 to (Evaluate:01Jun2015) Recorded   13  Torsemide 20 MG Oral Tablet; Therapy: 59WUK7833 to (Evaluate:01Jun2015) Recorded   14  TraMADol HCl ER (Biphasic) 100 MG Oral Tablet Extended Release 24 Hour; Take 1 PO    bid; Therapy: 18ABB5572 to (Evaluate:13Jun2016); Last Rx:15Mar2016 Ordered   15  Vitamin D (Ergocalciferol) 05830 UNIT Oral Capsule; Therapy: 60OIU7127 to (Evaluate:21Apr2015) Recorded    Allergies    1   Iodinated Contrast Media    Signatures   Electronically signed by : Paradise Jones, ; Mar 21 2016  1:16PM EST                       (Author)

## 2018-01-17 NOTE — MISCELLANEOUS
Message   Recorded as Task   Date: 11/17/2016 12:08 PM, Created By: Tab Lyons   Task Name: Follow Up   Assigned To: [facility]   Regarding Patient: Abdifatah Palmer, Status: In Progress   Comment:    Fermin Moore - 17 Nov 2016 12:08 PM     TASK CREATED  Caller: Self; General Medical Question; [phone] (Home)  m 11/17/2016 @ 1126  Pt calling, states he received a "cannot reach you letter " pls cb  Jackie Hernandez - 17 Nov 2016 2:37 PM     TASK EDITED   S/w the pt  and he states it was his daughters wedding and he couldn't handle her getting  so he "smoked" one with his son  It was a one time thing  Reiterated policy of the narcotic contract, pt  verbalized understanding and pt  to be called back c/ DG recommendations  Alvin Linares - 68 Nov 2016 11:04 AM     TASK REASSIGNED: Previously Assigned To Alvin Linares  Please call the patient and advise him that unfortuneately at this point, I have no choice but to discharge him from the practice  We will send a formal discharge letter  Gifty Ramsya - 18 Nov 2016 1:45 PM     TASK REPLIED TO: Previously Assigned To Alvin Linares  ****FYI****    S/w pt  and advised of above  Pt  verbalized understanding and asked if we can check if DG sent his Lyrica out  S/w DG, Lyrica was sent out for pt  Pt  was advised of above  Pt  verbalized understanding, and states to please relay to DG that he "did help me immensely " Pt  states "I realize it was my fault " Pt  states that he is "saying goodbye with no hard feelings " Pt  states to please thank Alvin for him  Pt  was advised that I will relay to DG      ****   Alvin Linares - 18 Nov 2016 1:56 PM     TASK REPLIED TO: Previously Assigned To Alvin Linares  Provider aware  THank you  Gifty Ramsay - 18 Nov 2016 3:19 PM     TASK IN PROGRESS        Active Problems    1  ALC (alcoholic liver cirrhosis) (571 2) (K70 30)   2  Chronic low back pain (724 2,338 29) (M54 5,G89 29)   3   Chronic lumbar radiculopathy (724 4) (M54 16)   4  Encounter for long-term opiate analgesic use (V58 69) (Z79 891)   5  Foraminal stenosis of lumbar region (724 02) (M99 83)   6  Opioid dependence, continuous (304 01) (F11 20)   7  Pain syndrome, chronic (338 4) (G89 4)    Current Meds   1  Ciprofloxacin HCl - 500 MG Oral Tablet; Therapy: 25KMD1481 to (Evaluate:17Feb2016) Recorded   2  Citalopram Hydrobromide 40 MG Oral Tablet; Therapy: 50FAL5010 to (Evaluate:01Jun2015) Recorded   3  Folic Acid 1 MG Oral Tablet; Therapy: 73VYI4555 to (Evaluate:01Jun2015) Recorded   4  Lisinopril 20 MG Oral Tablet; Therapy: 18CMH7396 to (Evaluate:02Apr2015) Recorded   5  Lyrica 200 MG Oral Capsule; Take 1 PO bid; Therapy: 54XAZ4473 to (Evaluate:16Feb2017); Last Rx:18Nov2016 Ordered   6  Naproxen 500 MG Oral Tablet; Therapy: 35LRM0921 to (Evaluate:02Apr2015) Recorded   7  Omeprazole 20 MG Oral Capsule Delayed Release; Therapy: 68HZR8512 to (Evaluate:23Mar2015) Recorded   8  Potassium Chloride 20 MEQ Oral Packet; Therapy: 15SRW0207 to (Evaluate:01Jun2015) Recorded   9  ProAir  (90 Base) MCG/ACT Inhalation Aerosol Solution; Therapy: 17QPH4303 to (Evaluate:17May2015) Recorded   10  ROPINIRole HCl - 3 MG Oral Tablet; Therapy: 65UIN8417 to (Evaluate:01Jun2015) Recorded   11  Spironolactone 100 MG Oral Tablet; Therapy: 80NMH9578 to (Evaluate:01Jun2015) Recorded   12  Torsemide 20 MG Oral Tablet; Therapy: 07LIR2702 to (Evaluate:01Jun2015) Recorded   13  TraMADol HCl ER (Biphasic) 100 MG Oral Tablet Extended Release 24 Hour; Take 1 PO    bid; Therapy: 82SUG9554 to (Evaluate:23Jan2017); Last Rx:78Ckg6891 Ordered   14  Vitamin D (Ergocalciferol) 73538 UNIT Oral Capsule; Therapy: 42RWE4222 to (Evaluate:21Apr2015) Recorded    Allergies    1   Iodinated Contrast Media    Signatures   Electronically signed by : Jeronimo Castillo RN; Nov 18 2016  3:19PM EST                       (Author)

## 2018-01-17 NOTE — MISCELLANEOUS
Message   Recorded as Task   Date: 05/27/2016 09:30 AM, Created By: Oralia Moody   Task Name: Follow Up   Assigned To: [facility]   Regarding Patient: Candace Romo, Status: In Progress   Comment:    Cruzito Vincent - 27 May 2016 9:30 AM     TASK CREATED  Caller: Self; Authorization Status; [phone] (Home)  received messag from pt requesting DG give a call to his ins company to determine what can be done about the patient getting coverage for tramadol  Alvin Linares - 27 May 2016 10:01 AM     TASK REPLIED TO: Previously Assigned To [facility] [facility]  Does he mean a pre-auth or formulary excpetion request? Do they cover it or is it expensive? Teresa Posadas - 27 May 2016 11:26 AM     TASK EDITED    I spoke with pt, his insurance comp doesn't cover  ************   Alvin Linares - 27 May 2016 11:39 AM     TASK REPLIED TO: Previously Assigned To Alvin Linares  I will work on a non-formulary exception request for the Tramadol ER  Teresa Posadas - 27 May 2016 11:43 AM     TASK IN PROGRESS        Active Problems    1  ALC (alcoholic liver cirrhosis) (571 2) (K70 30)   2  Chronic low back pain (724 2,338 29) (M54 5,G89 29)   3  Chronic lumbar radiculopathy (724 4) (M54 16)   4  Encounter for long-term opiate analgesic use (V58 69) (Z79 891)   5  Foraminal stenosis of lumbar region (724 02) (M99 83)   6  Opioid dependence, continuous (304 01) (F11 20)   7  Pain syndrome, chronic (338 4) (G89 4)    Current Meds   1  Ciprofloxacin HCl - 500 MG Oral Tablet; Therapy: 34PDF7657 to (Evaluate:58Agz8846) Recorded   2  Citalopram Hydrobromide 40 MG Oral Tablet; Therapy: 61KFQ7178 to (Evaluate:01Jun2015) Recorded   3  Folic Acid 1 MG Oral Tablet; Therapy: 28OFG1607 to (Evaluate:01Jun2015) Recorded   4  Lisinopril 20 MG Oral Tablet; Therapy: 59IBR0866 to (Evaluate:02Apr2015) Recorded   5  Lyrica 300 MG Oral Capsule; Take 1 po bid; Therapy: 16IFM8360 to (Evaluate:18Oct2016);  Last Rx:21Apr2016 Ordered   6  MetFORMIN HCl - 500 MG Oral Tablet; Therapy: 51CSH3437 to (Evaluate:01Jun2015) Recorded   7  Naproxen 500 MG Oral Tablet; Therapy: 37OHV9941 to (Evaluate:02Apr2015) Recorded   8  Omeprazole 20 MG Oral Capsule Delayed Release; Therapy: 53JKM6196 to (Evaluate:23Mar2015) Recorded   9  Potassium Chloride 20 MEQ Oral Packet; Therapy: 23QAE9581 to (Evaluate:01Jun2015) Recorded   10  ProAir  (90 Base) MCG/ACT Inhalation Aerosol Solution; Therapy: 72DSP5162 to (Evaluate:17May2015) Recorded   11  ROPINIRole HCl - 3 MG Oral Tablet; Therapy: 42Eyp2191 to (Evaluate:01Jun2015) Recorded   12  Spironolactone 100 MG Oral Tablet; Therapy: 45UUG8757 to (Evaluate:01Jun2015) Recorded   13  Torsemide 20 MG Oral Tablet; Therapy: 17ESQ0650 to (Evaluate:01Jun2015) Recorded   14  TraMADol HCl ER (Biphasic) 100 MG Oral Tablet Extended Release 24 Hour; Take 1 PO    bid; Therapy: 65SLJ0889 to (Evaluate:13Jun2016); Last Rx:15Mar2016 Ordered   15  Vitamin D (Ergocalciferol) 25248 UNIT Oral Capsule; Therapy: 90REY6677 to (Evaluate:21Apr2015) Recorded    Allergies    1   Iodinated Contrast Media    Signatures   Electronically signed by : Tania Almeida, ; May 31 2016  9:58AM EST                       (Author)

## 2018-01-17 NOTE — MISCELLANEOUS
Message   Recorded as Task   Date: 06/10/2016 07:37 AM, Created By: Jasson Valles   Task Name: Follow Up   Assigned To: [facility]   Regarding Patient: Farzad Lane, Status: Active   Alethea Garcia - 10 Romulo 2016 7:37 AM     TASK CREATED  Can you please call [employer] [facility] Program at [phone] to get an update on the patient's Lyrica asst application  It was faxed for the 8th time and on Monday I called to confirm that they had everything they needed and it was being processed  However, this was supposed to be sent to the supervisor, Basilio Palacios, and EXPEDITED  If they say it is pending, I would like you to ask to speak to a supervisor and that this needs to be handled today and that waiting any longer is unacceptable  Fermin Moore - 10 Romulo 2016 11:38 AM     TASK EDITED  s/w Neftali Marrero at [employer], [address] to care program  States order was received 6/8/2016, being processed at this time  Please allow 7-10 days for processing from date of order  Per Claudia Dunn pfizer Id # [id number]   Alvin Linares - 10 Romulo 2016 12:20 PM     TASK REPLIED TO: Previously Assigned To Alvin Linares  Can you call the patient and review this with him  We will call once we know anything  Also I put another pack of Lyrica samples up front for him to  to try to help get him through until next week when we hopefully find something out  He should also ask if we have another box or two when he stops by as we get Lyrica samples randomly through the mail  Fermin Moore - 10 Romulo 2016 1:07 PM     TASK EDITED  S/w pt, advised of above  pt verbalized understanding and appreciation  Provided office hrs for   Active Problems    1  ALC (alcoholic liver cirrhosis) (571 2) (K70 30)   2  Chronic low back pain (724 2,338 29) (M54 5,G89 29)   3  Chronic lumbar radiculopathy (724 4) (M54 16)   4  Encounter for long-term opiate analgesic use (V58 69) (Z79 891)   5   Foraminal stenosis of lumbar region (724 02) (M99 83)   6  Opioid dependence, continuous (304 01) (F11 20)   7  Pain syndrome, chronic (338 4) (G89 4)    Current Meds   1  Ciprofloxacin HCl - 500 MG Oral Tablet; Therapy: 58WTZ4807 to (Evaluate:17Feb2016) Recorded   2  Citalopram Hydrobromide 40 MG Oral Tablet; Therapy: 09KES0554 to (Evaluate:01Jun2015) Recorded   3  Folic Acid 1 MG Oral Tablet; Therapy: 04ATJ4182 to (Evaluate:01Jun2015) Recorded   4  Lisinopril 20 MG Oral Tablet; Therapy: 60ZPS7826 to (Evaluate:02Apr2015) Recorded   5  Lyrica 300 MG Oral Capsule; Take 1 po bid; Therapy: 53XNZ0158 to (Evaluate:18Oct2016); Last Rx:21Apr2016 Ordered   6  MetFORMIN HCl - 500 MG Oral Tablet; Therapy: 22SJK3434 to (Evaluate:01Jun2015) Recorded   7  Naproxen 500 MG Oral Tablet; Therapy: 60CBP9472 to (Evaluate:02Apr2015) Recorded   8  Omeprazole 20 MG Oral Capsule Delayed Release; Therapy: 03GZE0339 to (Evaluate:23Mar2015) Recorded   9  Potassium Chloride 20 MEQ Oral Packet; Therapy: 58WSG3678 to (Evaluate:01Jun2015) Recorded   10  ProAir  (90 Base) MCG/ACT Inhalation Aerosol Solution; Therapy: 72CUH7231 to (Evaluate:17May2015) Recorded   11  ROPINIRole HCl - 3 MG Oral Tablet; Therapy: 13Fwj8996 to (Evaluate:01Jun2015) Recorded   12  Spironolactone 100 MG Oral Tablet; Therapy: 16NZA5921 to (Evaluate:01Jun2015) Recorded   13  Torsemide 20 MG Oral Tablet; Therapy: 96QKF2486 to (Evaluate:01Jun2015) Recorded   14  TraMADol HCl ER (Biphasic) 100 MG Oral Tablet Extended Release 24 Hour; Take 1 PO    bid; Therapy: 14MHO4677 to (Evaluate:13Jun2016); Last Rx:15Mar2016 Ordered   15  Vitamin D (Ergocalciferol) 99919 UNIT Oral Capsule; Therapy: 83CMX5934 to (Evaluate:21Apr2015) Recorded    Allergies    1   Iodinated Contrast Media    Signatures   Electronically signed by : Ne Levin, ; Romulo 10 2016  1:07PM EST                       (Author)

## 2018-01-18 NOTE — MISCELLANEOUS
Message   Recorded as Task   Date: 06/07/2016 11:01 AM, Created By: Meme Thapa   Task Name: Miscellaneous   Assigned To: Meme Thapa   Regarding Patient: Farzad Lane, Status: Active   CommentAbimael Solorio - 07 Jun 2016 11:01 AM     TASK CREATED  Patient missed his appt  6/7/16 @ 10:15 AM with Alvin  I called the patient and left a message on his answering machine to call the office to reschedule this appt  Alvin Linares - 07 Jun 2016 11:23 AM     TASK REPLIED TO: Previously Assigned To Alvin Linares  Provider aware  Thank you  Active Problems    1  ALC (alcoholic liver cirrhosis) (571 2) (K70 30)   2  Chronic low back pain (724 2,338 29) (M54 5,G89 29)   3  Chronic lumbar radiculopathy (724 4) (M54 16)   4  Encounter for long-term opiate analgesic use (V58 69) (Z79 891)   5  Foraminal stenosis of lumbar region (724 02) (M99 83)   6  Opioid dependence, continuous (304 01) (F11 20)   7  Pain syndrome, chronic (338 4) (G89 4)    Current Meds   1  Ciprofloxacin HCl - 500 MG Oral Tablet; Therapy: 30OEN4913 to (Evaluate:76Zvo5602) Recorded   2  Citalopram Hydrobromide 40 MG Oral Tablet; Therapy: 05FKU2510 to (Evaluate:01Jun2015) Recorded   3  Folic Acid 1 MG Oral Tablet; Therapy: 87CBM9309 to (Evaluate:01Jun2015) Recorded   4  Lisinopril 20 MG Oral Tablet; Therapy: 95IAV0963 to (Evaluate:02Apr2015) Recorded   5  Lyrica 300 MG Oral Capsule; Take 1 po bid; Therapy: 87QYW9546 to (Evaluate:45Hjx9266); Last Rx:21Apr2016 Ordered   6  MetFORMIN HCl - 500 MG Oral Tablet; Therapy: 73MAQ6396 to (Evaluate:01Jun2015) Recorded   7  Naproxen 500 MG Oral Tablet; Therapy: 19QRY0380 to (Evaluate:02Apr2015) Recorded   8  Omeprazole 20 MG Oral Capsule Delayed Release; Therapy: 58EDE5391 to (Evaluate:23Mar2015) Recorded   9  Potassium Chloride 20 MEQ Oral Packet; Therapy: 45LAS4615 to (Evaluate:01Jun2015) Recorded   10  ProAir  (90 Base) MCG/ACT Inhalation Aerosol Solution;     Therapy: 01IUP0681 to (Evaluate:32Vcv0712) Recorded   11  ROPINIRole HCl - 3 MG Oral Tablet; Therapy: 22Xfw8737 to (Evaluate:01Jun2015) Recorded   12  Spironolactone 100 MG Oral Tablet; Therapy: 28CZD1328 to (Evaluate:01Jun2015) Recorded   13  Torsemide 20 MG Oral Tablet; Therapy: 15TKK9363 to (Evaluate:01Jun2015) Recorded   14  TraMADol HCl ER (Biphasic) 100 MG Oral Tablet Extended Release 24 Hour; Take 1 PO    bid; Therapy: 26SFY6155 to (Evaluate:13Jun2016); Last Rx:15Mar2016 Ordered   15  Vitamin D (Ergocalciferol) 12303 UNIT Oral Capsule; Therapy: 66AIZ0984 to (Evaluate:21Apr2015) Recorded    Allergies    1  Iodinated Contrast Media    Signatures   Electronically signed by :  Colette Lamas, ; Jun 7 2016  1:32PM EST                       (Author)

## 2019-02-27 DIAGNOSIS — K70.31 ALCOHOLIC CIRRHOSIS OF LIVER WITH ASCITES (HCC): Primary | ICD-10-CM

## 2019-02-28 DIAGNOSIS — G25.81 RESTLESS LEGS: Primary | ICD-10-CM

## 2019-02-28 RX ORDER — ROPINIROLE 3 MG/1
TABLET, FILM COATED ORAL
Qty: 270 TABLET | Refills: 0 | Status: SHIPPED | OUTPATIENT
Start: 2019-02-28 | End: 2019-05-31 | Stop reason: SDUPTHER

## 2019-02-28 RX ORDER — SPIRONOLACTONE 100 MG/1
TABLET, FILM COATED ORAL
Qty: 180 TABLET | Refills: 3 | Status: SHIPPED | OUTPATIENT
Start: 2019-02-28 | End: 2020-03-25

## 2019-02-28 RX ORDER — OMEPRAZOLE 20 MG/1
CAPSULE, DELAYED RELEASE ORAL
Qty: 90 CAPSULE | Refills: 3 | Status: SHIPPED | OUTPATIENT
Start: 2019-02-28 | End: 2020-03-25

## 2019-02-28 RX ORDER — TORSEMIDE 20 MG/1
TABLET ORAL
Qty: 90 TABLET | Refills: 3 | Status: SHIPPED | OUTPATIENT
Start: 2019-02-28 | End: 2020-03-25

## 2019-02-28 RX ORDER — NADOLOL 40 MG/1
TABLET ORAL
Qty: 90 TABLET | Refills: 3 | Status: SHIPPED | OUTPATIENT
Start: 2019-02-28 | End: 2020-03-25

## 2019-02-28 RX ORDER — MIRTAZAPINE 15 MG/1
TABLET, FILM COATED ORAL
Qty: 90 TABLET | Refills: 3 | Status: SHIPPED | OUTPATIENT
Start: 2019-02-28 | End: 2020-03-25

## 2019-03-18 DIAGNOSIS — K72.90 HEPATIC ENCEPHALOPATHY (HCC): Primary | ICD-10-CM

## 2019-03-18 RX ORDER — RIFAXIMIN 550 MG/1
TABLET ORAL
Qty: 180 TABLET | Refills: 8 | Status: SHIPPED | OUTPATIENT
Start: 2019-03-18 | End: 2020-03-25

## 2019-05-31 DIAGNOSIS — G25.81 RESTLESS LEGS: ICD-10-CM

## 2019-05-31 RX ORDER — ROPINIROLE 3 MG/1
TABLET, FILM COATED ORAL
Qty: 270 TABLET | Refills: 1 | Status: SHIPPED | OUTPATIENT
Start: 2019-05-31 | End: 2020-01-06

## 2020-01-03 DIAGNOSIS — G25.81 RESTLESS LEGS: ICD-10-CM

## 2020-01-06 RX ORDER — ROPINIROLE 3 MG/1
TABLET, FILM COATED ORAL
Qty: 270 TABLET | Refills: 0 | Status: SHIPPED | OUTPATIENT
Start: 2020-01-06 | End: 2020-03-25

## 2020-02-25 ENCOUNTER — TELEPHONE (OUTPATIENT)
Dept: GASTROENTEROLOGY | Facility: CLINIC | Age: 60
End: 2020-02-25

## 2020-02-25 NOTE — TELEPHONE ENCOUNTER
Jennifer from [facility] states Xifaxan 529 needs pre cert  He will send info via fax to process through [facility]

## 2020-02-25 NOTE — TELEPHONE ENCOUNTER
Patient has not been seen since 11/9/2018  Office visit required for future refills  PA request submitted, response should be received within 72 hours

## 2020-02-25 NOTE — TELEPHONE ENCOUNTER
I called patient to advise that an office visit is required for refills of his GI Medications  Patient is agreeable  He was just at his PCP, lab work ordered from [facility]  I transferred his call to the  to schedule appointment

## 2020-02-25 NOTE — TELEPHONE ENCOUNTER
Michelle Richard (Wilkins: George Durán)   Rx #: [id number] Xifaxan 550MG tablets   Request Reference Number: [id number]   Oren Tinoco TAB 550MG is approved through 12/31/2039

## 2020-03-24 DIAGNOSIS — K72.90 HEPATIC ENCEPHALOPATHY (HCC): ICD-10-CM

## 2020-03-25 DIAGNOSIS — K70.31 ALCOHOLIC CIRRHOSIS OF LIVER WITH ASCITES (HCC): ICD-10-CM

## 2020-03-25 DIAGNOSIS — G25.81 RESTLESS LEGS: ICD-10-CM

## 2020-03-25 RX ORDER — ROPINIROLE 3 MG/1
TABLET, FILM COATED ORAL
Qty: 270 TABLET | Refills: 0 | Status: SHIPPED | OUTPATIENT
Start: 2020-03-25 | End: 2020-07-06

## 2020-03-25 RX ORDER — MIRTAZAPINE 15 MG/1
TABLET, FILM COATED ORAL
Qty: 90 TABLET | Refills: 2 | Status: SHIPPED | OUTPATIENT
Start: 2020-03-25

## 2020-03-25 RX ORDER — OMEPRAZOLE 20 MG/1
CAPSULE, DELAYED RELEASE ORAL
Qty: 90 CAPSULE | Refills: 2 | Status: SHIPPED | OUTPATIENT
Start: 2020-03-25

## 2020-03-25 RX ORDER — TORSEMIDE 20 MG/1
TABLET ORAL
Qty: 90 TABLET | Refills: 2 | Status: SHIPPED | OUTPATIENT
Start: 2020-03-25

## 2020-03-25 RX ORDER — NADOLOL 40 MG/1
TABLET ORAL
Qty: 90 TABLET | Refills: 3 | Status: SHIPPED | OUTPATIENT
Start: 2020-03-25

## 2020-03-25 RX ORDER — SPIRONOLACTONE 100 MG/1
TABLET, FILM COATED ORAL
Qty: 180 TABLET | Refills: 3 | Status: SHIPPED | OUTPATIENT
Start: 2020-03-25

## 2020-03-25 RX ORDER — RIFAXIMIN 550 MG/1
TABLET ORAL
Qty: 180 TABLET | Refills: 7 | Status: SHIPPED | OUTPATIENT
Start: 2020-03-25 | End: 2020-06-23

## 2020-03-31 ENCOUNTER — TELEPHONE (OUTPATIENT)
Dept: GASTROENTEROLOGY | Facility: CLINIC | Age: 60
End: 2020-03-31

## 2020-03-31 NOTE — TELEPHONE ENCOUNTER
Xifaxin needs PA per [facility]  Patient not seen since 11/2018    Message left for patient to call to give us current pharmacy coverage info so we can work on [place]

## 2020-06-11 ENCOUNTER — TELEPHONE (OUTPATIENT)
Dept: GASTROENTEROLOGY | Facility: CLINIC | Age: 60
End: 2020-06-11

## 2020-06-27 DIAGNOSIS — G25.81 RESTLESS LEGS: ICD-10-CM

## 2020-07-06 RX ORDER — ROPINIROLE 3 MG/1
TABLET, FILM COATED ORAL
Qty: 270 TABLET | Refills: 0 | Status: SHIPPED | OUTPATIENT
Start: 2020-07-06

## 2020-09-10 ENCOUNTER — TELEPHONE (OUTPATIENT)
Dept: GASTROENTEROLOGY | Facility: CLINIC | Age: 60
End: 2020-09-10

## 2020-09-10 NOTE — TELEPHONE ENCOUNTER
Patient called  States he had to work something out to [employer] so he can continue using [facility]  Now has appt scheduled for October with [facility]  Thinks he needs paracentesis  Advised to proceed to ER re: paracentesis since he has not been seen here since November 2018

## 2020-10-08 ENCOUNTER — NURSING HOME VISIT (OUTPATIENT)
Dept: WOUND CARE | Facility: HOSPITAL | Age: 60
End: 2020-10-08
Payer: COMMERCIAL

## 2020-10-08 DIAGNOSIS — E46 PROTEIN-CALORIE MALNUTRITION, UNSPECIFIED SEVERITY (HCC): ICD-10-CM

## 2020-10-08 DIAGNOSIS — F32.9 CURRENT EPISODE OF MAJOR DEPRESSIVE DISORDER WITHOUT PRIOR EPISODE, UNSPECIFIED DEPRESSION EPISODE SEVERITY: ICD-10-CM

## 2020-10-08 DIAGNOSIS — L89.322 PRESSURE INJURY OF LEFT BUTTOCK, STAGE 2 (HCC): Primary | ICD-10-CM

## 2020-10-08 PROCEDURE — 99304 1ST NF CARE SF/LOW MDM 25: CPT | Performed by: NURSE PRACTITIONER

## 2020-10-20 ENCOUNTER — TELEPHONE (OUTPATIENT)
Dept: GASTROENTEROLOGY | Facility: CLINIC | Age: 60
End: 2020-10-20

## 2020-12-14 ENCOUNTER — TELEPHONE (OUTPATIENT)
Dept: GASTROENTEROLOGY | Facility: CLINIC | Age: 60
End: 2020-12-14